# Patient Record
Sex: MALE | Race: WHITE
[De-identification: names, ages, dates, MRNs, and addresses within clinical notes are randomized per-mention and may not be internally consistent; named-entity substitution may affect disease eponyms.]

---

## 2018-07-27 ENCOUNTER — HOSPITAL ENCOUNTER (INPATIENT)
Dept: HOSPITAL 45 - C.CATH | Age: 63
LOS: 1 days | Discharge: HOME | DRG: 247 | End: 2018-07-28
Attending: HOSPITALIST | Admitting: HOSPITALIST
Payer: COMMERCIAL

## 2018-07-27 VITALS — DIASTOLIC BLOOD PRESSURE: 85 MMHG | HEART RATE: 62 BPM | SYSTOLIC BLOOD PRESSURE: 119 MMHG | OXYGEN SATURATION: 97 %

## 2018-07-27 VITALS
TEMPERATURE: 98.6 F | HEART RATE: 68 BPM | DIASTOLIC BLOOD PRESSURE: 77 MMHG | SYSTOLIC BLOOD PRESSURE: 114 MMHG | OXYGEN SATURATION: 95 %

## 2018-07-27 VITALS — HEART RATE: 61 BPM | DIASTOLIC BLOOD PRESSURE: 79 MMHG | SYSTOLIC BLOOD PRESSURE: 108 MMHG | OXYGEN SATURATION: 97 %

## 2018-07-27 VITALS — OXYGEN SATURATION: 95 % | DIASTOLIC BLOOD PRESSURE: 84 MMHG | SYSTOLIC BLOOD PRESSURE: 111 MMHG | HEART RATE: 65 BPM

## 2018-07-27 VITALS — HEART RATE: 62 BPM | SYSTOLIC BLOOD PRESSURE: 119 MMHG | OXYGEN SATURATION: 99 % | DIASTOLIC BLOOD PRESSURE: 85 MMHG

## 2018-07-27 VITALS — DIASTOLIC BLOOD PRESSURE: 88 MMHG | OXYGEN SATURATION: 97 % | HEART RATE: 57 BPM | SYSTOLIC BLOOD PRESSURE: 124 MMHG

## 2018-07-27 VITALS — HEART RATE: 63 BPM | OXYGEN SATURATION: 97 %

## 2018-07-27 VITALS
BODY MASS INDEX: 38.62 KG/M2 | WEIGHT: 240.3 LBS | HEIGHT: 66 IN | BODY MASS INDEX: 38.62 KG/M2 | WEIGHT: 240.3 LBS | BODY MASS INDEX: 38.62 KG/M2 | HEIGHT: 66 IN

## 2018-07-27 VITALS — DIASTOLIC BLOOD PRESSURE: 86 MMHG | HEART RATE: 59 BPM | SYSTOLIC BLOOD PRESSURE: 115 MMHG

## 2018-07-27 VITALS
SYSTOLIC BLOOD PRESSURE: 123 MMHG | TEMPERATURE: 97.52 F | HEART RATE: 59 BPM | OXYGEN SATURATION: 98 % | DIASTOLIC BLOOD PRESSURE: 83 MMHG

## 2018-07-27 VITALS — DIASTOLIC BLOOD PRESSURE: 86 MMHG | SYSTOLIC BLOOD PRESSURE: 134 MMHG | OXYGEN SATURATION: 95 % | HEART RATE: 64 BPM

## 2018-07-27 VITALS — OXYGEN SATURATION: 97 % | SYSTOLIC BLOOD PRESSURE: 127 MMHG | HEART RATE: 56 BPM | DIASTOLIC BLOOD PRESSURE: 92 MMHG

## 2018-07-27 VITALS — OXYGEN SATURATION: 99 % | SYSTOLIC BLOOD PRESSURE: 103 MMHG | HEART RATE: 59 BPM | DIASTOLIC BLOOD PRESSURE: 69 MMHG

## 2018-07-27 VITALS
HEART RATE: 69 BPM | DIASTOLIC BLOOD PRESSURE: 83 MMHG | SYSTOLIC BLOOD PRESSURE: 139 MMHG | TEMPERATURE: 98.96 F | OXYGEN SATURATION: 98 %

## 2018-07-27 VITALS — OXYGEN SATURATION: 97 % | HEART RATE: 56 BPM | SYSTOLIC BLOOD PRESSURE: 110 MMHG | DIASTOLIC BLOOD PRESSURE: 81 MMHG

## 2018-07-27 VITALS
DIASTOLIC BLOOD PRESSURE: 80 MMHG | TEMPERATURE: 98.06 F | SYSTOLIC BLOOD PRESSURE: 112 MMHG | HEART RATE: 60 BPM | OXYGEN SATURATION: 98 %

## 2018-07-27 VITALS — DIASTOLIC BLOOD PRESSURE: 81 MMHG | SYSTOLIC BLOOD PRESSURE: 118 MMHG | HEART RATE: 64 BPM

## 2018-07-27 VITALS — OXYGEN SATURATION: 98 % | SYSTOLIC BLOOD PRESSURE: 123 MMHG | HEART RATE: 58 BPM | DIASTOLIC BLOOD PRESSURE: 87 MMHG

## 2018-07-27 DIAGNOSIS — E78.5: ICD-10-CM

## 2018-07-27 DIAGNOSIS — Z79.1: ICD-10-CM

## 2018-07-27 DIAGNOSIS — Z82.49: ICD-10-CM

## 2018-07-27 DIAGNOSIS — I10: ICD-10-CM

## 2018-07-27 DIAGNOSIS — M1A.00X0: ICD-10-CM

## 2018-07-27 DIAGNOSIS — M79.2: ICD-10-CM

## 2018-07-27 DIAGNOSIS — Z79.899: ICD-10-CM

## 2018-07-27 DIAGNOSIS — K21.0: ICD-10-CM

## 2018-07-27 DIAGNOSIS — J30.9: ICD-10-CM

## 2018-07-27 DIAGNOSIS — I25.10: Primary | ICD-10-CM

## 2018-07-27 PROCEDURE — B41FYZZ FLUOROSCOPY OF RIGHT LOWER EXTREMITY ARTERIES USING OTHER CONTRAST: ICD-10-PCS | Performed by: INTERNAL MEDICINE

## 2018-07-27 PROCEDURE — B211YZZ FLUOROSCOPY OF MULTIPLE CORONARY ARTERIES USING OTHER CONTRAST: ICD-10-PCS | Performed by: INTERNAL MEDICINE

## 2018-07-27 PROCEDURE — 4A023N7 MEASUREMENT OF CARDIAC SAMPLING AND PRESSURE, LEFT HEART, PERCUTANEOUS APPROACH: ICD-10-PCS | Performed by: INTERNAL MEDICINE

## 2018-07-27 PROCEDURE — B215YZZ FLUOROSCOPY OF LEFT HEART USING OTHER CONTRAST: ICD-10-PCS | Performed by: INTERNAL MEDICINE

## 2018-07-27 PROCEDURE — 027034Z DILATION OF CORONARY ARTERY, ONE ARTERY WITH DRUG-ELUTING INTRALUMINAL DEVICE, PERCUTANEOUS APPROACH: ICD-10-PCS | Performed by: INTERNAL MEDICINE

## 2018-07-27 RX ADMIN — ACETAMINOPHEN PRN MG: 325 TABLET ORAL at 19:38

## 2018-07-27 RX ADMIN — ALBUTEROL SULFATE SCH PUFFS: 90 AEROSOL, METERED RESPIRATORY (INHALATION) at 17:24

## 2018-07-27 RX ADMIN — SODIUM CHLORIDE SCH MLS/HR: 900 INJECTION, SOLUTION INTRAVENOUS at 09:45

## 2018-07-27 RX ADMIN — SODIUM CHLORIDE SCH MLS/HR: 900 INJECTION, SOLUTION INTRAVENOUS at 19:37

## 2018-07-27 RX ADMIN — ALBUTEROL SULFATE SCH PUFFS: 90 AEROSOL, METERED RESPIRATORY (INHALATION) at 19:36

## 2018-07-27 NOTE — CARDIAC CATHETERIZATION
Procedure Note


Procedure Date


Jul 27, 2018.





Pre-Procedure Diagnosis


Positive Stress Test





AUC Score


7





Post-Procedure Diagnosis


Severe CAD, Successful PCI





Procedure(s) Performed


Drug Eluting Stent, Femoral Artery Angiography





Cardiologist


Taco





Assistant(s)


Contino





Estimated Blood Loss


<15





Medication(s)


Clopidogrel, Fentanyl, Heparin, Nitroglycerin, Versed, Lidocaine 1%





Summary of Findings


Indication: Positive stress test





Access: 6Fr right CFA


Catheters: EBU 3.5 guide





Findings:


For full details of patient's coronary angiography please cath report dictated 

by Dr. Umaña.





Briefly, patient found to have severe single vessel disease with a 70-80% 

stenosis involving the proximal to mid LAD.  





Decision to proceed with PCI.


-- PCI --


Antithrombotic therapy: Heparin, Clopidogrel


Procedure:


LM cannulated with EBU 3.5 guide


BMW wire passed across lesion into distal vessel


Proximal/mid LAD lesion predilated with 2.5 compliant balloon


Dilated lesion stented with 2.75 x 18 [place] MARTA


Stent post-dilated with 3.0 noncompliant balloon


IC vasodilators administered for spasm


Post procedure TAMIE 3 flow, stent well expanded with minimal residual stenosis 

and no apparent cardiac complications.  





Arterial Closure: Angio-seal





Summary:


1. Successful PCI of proximal to mid LAD with single MARTA (2.75 x 18 [place]; post-

dilated with 3.0 NC).





Recommendations:


To PCU for continued monitoring


Loaded with Clopidogrel 600mg 


Continue dual-antiplatelet therapy for at least 6 months.


Continue statin, and ASCVD risk factor modification


Consult cardiac Rehab





Hemodynamics


Rest Ao:


130/69/98


Final Ao:


110/73/91


LV:


--





Recommendations


PCI without planned CABG





Specimens


None





Radiation Exposure (mGy)


3741





Contrast (mls)


210 opti (total)





Fluids (cc crystalloids)


123 NSS





Drains


none





Anesthesia


moderate





Procedural Complication(s)


None





Disposition


PCU





ACC Data


Cardiac Status


Clinical evaluation leading to the procedure


CAD Presntation:  Positive Stress Test


Heart Failure:  NYHA Class: CCS II


Cardiogenic Shock w/in 24Hrs:  No


Cardiac Arrest w/in 24Hrs:  No


Imaging studies past 6 months:  Yes


Stress studies past 6 months:  Yes


Stress Echocardiogram:  Yes - Positive, Risk/Extent of Ischemia (Low)





Closure Device


Percutaneous Entry Location:  Femoral


Closure Device:  Angio-Seal


Recommendations:  PCI without planned CABG


PCI Indication:  + Stress Test





Lesion


Segment Name:  mid LAD


Culprit Artery:  Yes


Stenosis Prior to Rx (%):  70-80%


Chronic Total Occlusion:  No


IVUS:  No


FFR:  No


Pre-Procedure TAMIE Flow:  3


Previously Treated Lesion:  No


Lesion Complexity:  Non-High/Non-C


Lesion Length (mm):  12


Thrombus Present:  No


Bifurcation Lesion:  Yes


Guidewire Across Lesion:  Yes


Guidewire:  


   Stenosis Post-Procedure (%):  0


   Device(s) Deployed:  Yes





Intraprocedure Events


Significant Dissection:  No


Perforation:  No

## 2018-07-27 NOTE — PROCEDURE NOTE
Cardiac Cath Report


Procedure:


1.  Left heart catheterization


2.  Coronary angiography


3.  Left ventriculography





History:


This is a 63-year-old male patient who has been experiencing exertional dyspnea 

and chest discomfort.  He had an equivocal exercise stress echocardiogram and 

has been referred for cardiac catheterization.





Procedure summary:


The patient had a Barbeau maneuver which indicated poor collateral circulation 

to the right hand.  Therefore after informed consent was obtained and the 

patient was prepped and draped in the usual manner a right transfemoral 

approach was utilized.  Preformed 5 [place] diagnostic catheters were utilized 

for the coronary angiograms.  A 5 [place] pigtail catheter was utilized for the 

left ventriculogram.  Following the procedure the patient underwent coronary 

intervention and was admitted in stable condition.





Coronary angiography:


Selective injections of the left coronary artery reveal a dual ostium left main 

trunk.  The left circumflex artery is dominant.  The left circumflex artery has 

multiple marginal branches the left circumflex artery has mild diffuse 

nonobstructive disease.  The left anterior descending artery gives off to small 

to medium sized diagonal branches.  The LAD is diffusely diseased.  At the 

takeoff of the first diagonal branch and first septal branch there is a high-

grade eccentric stenosis.  Selective injections of the right coronary artery 

revealed to be small and nondominant.  The right coronary artery has diffuse 

nonobstructive disease.





Left ventriculogram:


The left ventricle is of normal size with normal systolic function.  The aortic 

root and ascending aorta have normal morphology and diameter.  The mitral valve 

is competent.  The LVEDP is 10.





Summary:


The patient has a dominant left system.  There is a high-grade stenosis in the 

mid LAD.  The remainder the coronary anatomy has nonobstructive disease.  The 

left ventricular function is normal.  The estimated left ventricular ejection 

fraction is 60%.





Recommendations:


Are for coronary intervention on the LAD.

## 2018-07-27 NOTE — CARDIAC CATHETERIZATION
Procedure Note


Procedure Date


Jul 27, 2018.





Pre-Procedure Diagnosis


Angina





AUC Score


7





Post-Procedure Diagnosis


Severe CAD





Procedure(s) Performed


Coronary Angiography, Left Heart Cath, LV Angiography





Cardiologist


Dr. Umaña





Assistant(s)


None





Estimated Blood Loss


None





Medication(s)


Versed, Lidocaine 1%





Summary of Findings


See dictated report





Hemodynamics


Rest Ao:


125/79


Final Ao:


130/69


LV:


129/10





Recommendations


PCI without planned CABG





Specimens


None





Radiation Exposure (mGy)


1440





Contrast (mls)


90





Procedural Complication(s)


None





Disposition


PCU





ACC Data


Cardiac Status


Clinical evaluation leading to the procedure


CAD Presntation:  Stable angina


Anginal Classification:  CCS II


Heart Failure:  No


Cardiogenic Shock w/in 24Hrs:  No


Cardiac Arrest w/in 24Hrs:  No


Imaging studies past 6 months:  No


Stress studies past 6 months:  Yes


Stress Echocardiogram:  Yes - Indeterminant





Coronary Anatomy


Dominant:  Left


Left Main (% Stenosis):  Normal


LAD (% Stenosis):  Mid (90%)


Circumflex (% Stenosis):  Normal


RCA (% Stenosis):  Normal





Left Ventricular Angiography


EF (%):  60%





Diagnostic


Physician's Name:  Lee Umaña, DO


Status:  Elective





Closure Device


Percutaneous Entry Location:  Femoral

## 2018-07-27 NOTE — HISTORY AND PHYSICAL
History & Physical


Date & Time of Service:


2018 ~ 11:30


Chief Complaint:


Chest pain


Primary Care Physician:


Skylar Jarvis M.D.


History of Present Illness


63-year-old male who is status post plan cardiac catheterization today with 

Cornel Umaña in [place].  Patient had an outpatient stress test that was equivocal 

and therefore presented for the planned procedure today.  Cardiac cath showed a 

70-80% mid LAD lesion and one MARTA was placed.  Post-cath, patient is having 

some mild left-sided residual chest pain.  He currently rates the pain #2/10.  

He denies any radiation of the pain into his jaw, neck, shoulder, arm.  He 

denies shortness of breath and diaphoresis.  No lightheadedness or dizziness.  

He denies abdominal pain and nausea.  No other recent illnesses, fevers, chills.





Past Medical/Surgical History


Medical Problems:


(1) CAD (coronary artery disease)


Permanent Comment: 18-MARTA to LAD


Status: Chronic  





(2) Dyslipidemia


Status: Chronic  





(3) GERD (gastroesophageal reflux disease)


Status: Chronic  





(4) Gout


Status: Chronic  





(5) Hypertension


Status: Chronic  





Surgical Problems:


(1) History of appendectomy


Status: Chronic  











Family History





FH: CAD (coronary artery disease)


  FATHER (First MI at age 47)


  MOTHER (MI in her 60s)


Stroke


  FATHER ( from CVA at age 61)





Social History


Smoking Status:  Never Smoker


Alcohol Use:  none





Immunizations


History of Influenza Vaccine:  Yes


Influenza Vaccine Date:  Oct 11, 2017


History of Tetanus Vaccine?:  Yes


Tetanus Immunization Date:  Sep 16, 2011





Allergies


Coded Allergies:  


     No Known Allergies (Verified  Allergy, Mild, 10/1/07)





Home Medications


Scheduled


Albuterol Hfa (Ventolin Hfa), 1 PUFF INH Q4


Allopurinol (Zyloprim), 300 MG PO DAILY


Cholecalciferol (Vitamin D3 400), 400 INTER.UNIT PO DAILY


Fexofenadine Hcl (Allegra), 180 MG PO DAILY


Lansoprazole (Prevacid), 30 MG PO DAILY


Losartan Potassium (Cozaar), 50 MG PO DAILY


Sildenafil Citrate (Viagra), 100 MG PO PRN


Simvastatin (Zocor), 10 MG PO QPM


Sulindac (Sulindac), BID





Review of Systems


ROS per HPI, all other systems reviewed and negative





Physical Exam


Vital Signs











  Date Time  Temp Pulse Resp B/P (MAP) Pulse Ox O2 Delivery O2 Flow Rate FiO2


 


18 11:04  64 18 134/86 (102) 95 Nasal Cannula  


 


18 10:34  63 17  97   


 


18 10:30  56 16 127/92 (104) 97 Room Air  


 


18 10:15  57 16 124/88 (100) 97 Room Air  


 


18 10:09 36.4 59 18 123/83 98 Room Air  


 


18 09:40  66 16 126/93 (104) 100 Room Air  


 


18 09:35  66 16 128/86 (100) 99 Room Air  


 


18 09:30  66 16 116/83 (94) 99 Room Air  


 


18 07:17 37.2 69 14 139/83 (101) 98 Room Air  








General Appearance:  WD/WN, no apparent distress


Head:  normocephalic, atraumatic


Eyes:  normal inspection, EOMI, sclerae normal


ENT:  hearing grossly normal, + pertinent finding (Mucous membranes moist)


Neck:  supple, no JVD, trachea midline


Respiratory/Chest:  lungs clear, normal breath sounds, no respiratory distress


Cardiovascular:  regular rate, rhythm, no edema, normal peripheral pulses


Abdomen/GI:  normal bowel sounds, non tender, soft, no organomegaly


Extremities/Musculoskelatal:  normal inspection, no calf tenderness, normal 

capillary refill


Neurologic/Psych:  no motor/sensory deficits, alert, normal mood/affect, 

oriented x 3


Skin:  normal color, warm/dry





Diagnostics


Laboratory Results





Results Past 24 Hours








Test


  18


09:04 Range/Units


 


 


Kaolin Activated Coagulation


Time 202


    SECONDS


 











Impression


Assessment and Plan


CAD


-Admitted to telemetry post-cath


-Patient presented for planned cardiac catheterization after outpatient stress 

test was equivocal


-Cardiac cath showed a 70-80% mid LAD lesion, patient underwent MARTA placement


-Monitor overnight for worsening chest pain


-Started on dual antiplatelet therapy; continue outpatient statin and ARB





HYPERTENSION


-BP controlled, continue losartan





GERD


-Continue PPI





GOUT


-Continue allopurinol





DVT PROPHYLAXIS


-SCDs given the recent procedure





ADDENDUM:





This is a 63 year old male with a past medical history of HTN, CAD - presents 

for a scheduled cardiac catheterization.


Had one stent placed earlier today and is now in the PCU for monitoring.


He states he's doing fine; had 2/10 chest pain earlier, but now is improving.


Denies shortness of breath; no nausea/vomiting





Plan:


continued tele monitoring


monitor for chest pain; monitor labs in AM


repeat troponin as per cardiology; further management as per cardiology





Advanced Directives


Existing Living Will:  No


Existing Power of :  No





Resuscitation Status








VTE Prophylaxis


Will order VTE Prophylaxis:  Yes

## 2018-07-27 NOTE — [facility] POST OPERATIVE BRIEF NOTE
Preliminary Procedure Note


Procedure Date


Jul 27, 2018.





Pre-Procedure Diagnosis


Positive Stress Test





AUC Score


7





Post-Procedure Diagnosis


Severe CAD, Successful PCI





Procedure(s) Performed


Drug Eluting Stent





Cardiologist


Taco





Assistant(s)


Contino





Estimated Blood Loss


<15





Medication(s)


Clopidogrel, Fentanyl, Heparin, Nitroglycerin, Versed, Lidocaine 1%





Preliminary Findings


1. 70-80% calcified mid LAD stenosis.





2. Successful PCI of proximal to mid LAD with single MARTA (2.75 x 18 [place]; post-

dilated to 3.0).





Recommendations


PCI without planned CABG





Specimens


None





Drains


none





Anesthesia


moderate





Procedural Complication(s)


None





Disposition


PCU

## 2018-07-27 NOTE — POST SEDATION ASSESSMENT
Post Sedation Assessment


General


Date of Sedation


Jul 27, 2018.


Vital Signs:





Vital Signs Past 12 Hours








  Date Time  Temp Pulse Resp B/P (MAP) Pulse Ox O2 Delivery O2 Flow Rate FiO2


 


7/27/18 09:35  66 16 128/86 (100) 99 Room Air  


 


7/27/18 09:30  66 16 116/83 (94) 99 Room Air  


 


7/27/18 07:17 37.2 69 14 139/83 (101) 98 Room Air  











Post Procedure Recovery Score


Activity:  (2) Moves 4 extremities *


Respiration:  (2) Deep breath/cough


Circulation:  (2) +/-20% PreAnes Value


Consciousness:  (2) Fully Awake


Oxygen Saturation:  (2) > 92% On Room Air


Post Anesthesia Score:  10





Discharge Sedation


Level of Care:  Fast Track Phase II





Post Sedation Plan


On clinical assessment, the patient appears to have tolerated the sedation 

without complications. Patient is recovering as anticipated.





Patient will continue to be monitored by nursing and may be discharged when 

sedation discharge criteria are met per below protocol. 





Upon Completions of procedure and additional 15 minutes continue every 5 minute 

vital signs and the P.A.R. score; then discharge to a Phase I or Fast Track to 

Phase II per the following guidelines:





* Discharge Patient to appropriate Phase II area if PAR is 8 or greater or 

return to pre- procedure baseline.  The post - procedure orders will be as 

directed. 





* If PAR score is less than 8 or not return to pre-procedure baseline then 

patient will follow Phase I monitoring till PAR is reached for Phase II.  The 

Phase I may be done in procedure room or may call to secure a Phase I area.





* If naloxone or flumazenil are used for reversal, hold in Phase I for an 

additional 60 -120 minutes before discharge to Phase II.  Please call the 

Sedation Physician to re-evaluate and complete post-note for discharge to Phase 

II area. 





Do NOT discharge from procedure sedation or Phase 1 until post- sedation 

evaluation note is complete by procedure /sedation MD





Sedation Discharge Instructions to be given to the patient at discharge to home.

## 2018-07-27 NOTE — DIAGNOSTIC IMAGING REPORT
RIGHT LOWER EXTREMITY VENOUS DOPPLER



CLINICAL HISTORY: Leg swelling.    



COMPARISON STUDY:  No previous studies for comparison. 



TECHNIQUE:  Sonography of the deep venous system of the right lower extremity

was performed. Compression and augmentation were evaluated.



FINDINGS:  The right common femoral, superficial femoral and popliteal veins

were compressible. Augmentation was normal. Flow was shown within the deep calf

vessels.



IMPRESSION: No evidence of deep venous thrombus within the right lower

extremity.







Electronically signed by:  Jagjit Orta M.D.

7/27/2018 10:33 PM



Dictated Date/Time:  7/27/2018 10:33 PM

## 2018-07-28 VITALS
HEART RATE: 52 BPM | OXYGEN SATURATION: 96 % | TEMPERATURE: 98.42 F | SYSTOLIC BLOOD PRESSURE: 126 MMHG | DIASTOLIC BLOOD PRESSURE: 89 MMHG

## 2018-07-28 VITALS
SYSTOLIC BLOOD PRESSURE: 129 MMHG | DIASTOLIC BLOOD PRESSURE: 91 MMHG | TEMPERATURE: 98.24 F | OXYGEN SATURATION: 97 % | HEART RATE: 65 BPM

## 2018-07-28 VITALS — OXYGEN SATURATION: 96 % | HEART RATE: 74 BPM | DIASTOLIC BLOOD PRESSURE: 82 MMHG | SYSTOLIC BLOOD PRESSURE: 146 MMHG

## 2018-07-28 VITALS
DIASTOLIC BLOOD PRESSURE: 68 MMHG | HEART RATE: 56 BPM | TEMPERATURE: 98.78 F | SYSTOLIC BLOOD PRESSURE: 105 MMHG | OXYGEN SATURATION: 97 %

## 2018-07-28 VITALS
TEMPERATURE: 98.24 F | HEART RATE: 74 BPM | SYSTOLIC BLOOD PRESSURE: 146 MMHG | OXYGEN SATURATION: 96 % | DIASTOLIC BLOOD PRESSURE: 82 MMHG

## 2018-07-28 LAB
BASOPHILS # BLD: 0.02 K/UL (ref 0–0.2)
BASOPHILS NFR BLD: 0.3 %
BUN SERPL-MCNC: 16 MG/DL (ref 7–18)
CALCIUM SERPL-MCNC: 7.9 MG/DL (ref 8.5–10.1)
CO2 SERPL-SCNC: 25 MMOL/L (ref 21–32)
CREAT SERPL-MCNC: 1.07 MG/DL (ref 0.6–1.4)
EOS ABS #: 0.12 K/UL (ref 0–0.5)
EOSINOPHIL NFR BLD AUTO: 179 K/UL (ref 130–400)
GLUCOSE SERPL-MCNC: 87 MG/DL (ref 70–99)
HCT VFR BLD CALC: 40.6 % (ref 42–52)
HGB BLD-MCNC: 14 G/DL (ref 14–18)
IG#: 0.01 K/UL (ref 0–0.02)
IMM GRANULOCYTES NFR BLD AUTO: 30.7 %
LYMPHOCYTES # BLD: 2.35 K/UL (ref 1.2–3.4)
MCH RBC QN AUTO: 29.7 PG (ref 25–34)
MCHC RBC AUTO-ENTMCNC: 34.5 G/DL (ref 32–36)
MCV RBC AUTO: 86.2 FL (ref 80–100)
MONO ABS #: 0.69 K/UL (ref 0.11–0.59)
MONOCYTES NFR BLD: 9 %
NEUT ABS #: 4.46 K/UL (ref 1.4–6.5)
NEUTROPHILS # BLD AUTO: 1.6 %
NEUTROPHILS NFR BLD AUTO: 58.3 %
PMV BLD AUTO: 11.1 FL (ref 7.4–10.4)
POTASSIUM SERPL-SCNC: 4 MMOL/L (ref 3.5–5.1)
RED CELL DISTRIBUTION WIDTH CV: 12.6 % (ref 11.5–14.5)
RED CELL DISTRIBUTION WIDTH SD: 39.8 FL (ref 36.4–46.3)
SODIUM SERPL-SCNC: 139 MMOL/L (ref 136–145)
WBC # BLD AUTO: 7.65 K/UL (ref 4.8–10.8)

## 2018-07-28 RX ADMIN — ALBUTEROL SULFATE SCH PUFFS: 90 AEROSOL, METERED RESPIRATORY (INHALATION) at 12:00

## 2018-07-28 RX ADMIN — ALBUTEROL SULFATE SCH PUFFS: 90 AEROSOL, METERED RESPIRATORY (INHALATION) at 08:00

## 2018-07-28 RX ADMIN — SODIUM CHLORIDE SCH MLS/HR: 900 INJECTION, SOLUTION INTRAVENOUS at 04:31

## 2018-07-28 RX ADMIN — ACETAMINOPHEN PRN MG: 325 TABLET ORAL at 04:26

## 2018-07-28 RX ADMIN — ALBUTEROL SULFATE SCH PUFFS: 90 AEROSOL, METERED RESPIRATORY (INHALATION) at 00:00

## 2018-07-28 RX ADMIN — ALBUTEROL SULFATE SCH PUFFS: 90 AEROSOL, METERED RESPIRATORY (INHALATION) at 04:00

## 2018-07-28 NOTE — DISCHARGE INSTRUCTIONS
Discharge Instructions


Date of Service


Jul 28, 2018.





Admission


Reason for Admission:  CAD





Discharge


Discharge Diagnosis / Problem:  CAD,S/P Cardiac Cath and MARTA in LAD





Discharge Goals


Goal(s):  Prevent Disease Progression





Activity Recommendations


Activity Limitations:  resume your previous activity





.





Instructions / Follow-Up


Instructions / Follow-Up


Dr Jarvis on 8/2/18 at 12:45 PM .Dr Umaña on 8/14/18 at 8:30AM





Current Hospital Diet


Patient's current hospital diet: AHA Diet (Heart Healthy)





Discharge Diet


Recommended Diet:  AHA Diet (Heart Healthy)





Pending Studies


Studies pending at discharge:  no





Medical Emergencies








.


Who to Call and When:





Medical Emergencies:  If at any time you feel your situation is an emergency, 

please call 911 immediately.





.





Non-Emergent Contact


Non-Emergency issues call your:  Primary Care Provider





.





Past History


Medical & Surgical History:  


(1) CAD (coronary artery disease)


(2) Dyslipidemia


(3) Gout


(4) Hypertension


(5) GERD (gastroesophageal reflux disease)


(6) History of appendectomy


.








"Provider Documentation" section prepared by Chris Orozco.








.





Consultant Recommendations


Consultant Recommendations:


ACTIVITY RECOMMENDATIONS:





It is common to feel weak and fatigue for a few days.





*  Do not drive or operate any motorized equipment for the next


    three days.





*  Limit stair usage (2 or 3 trips a day only) for the next three days.





*  Do not lift anything heavier than 10 pounds for the next three


    days.





*  Do not engage in vigorous exercise or any sports for the next


    five days.





*  You may shower the day after your procedure, but do not 


    immerse the area for three days.  Cleanse the site gently with


    soap and water.








SPECIAL CARE INSTRUCTIONS:





* You may replace the pressure dressing or band-aid the morning 


after the procedure.





* After your procedure, it is normal to have a small bruise or small lump


at the site.  Examine your site daily for any change in the bruise or lump,


redness, swelling, drainage or numbness.  


Notify your doctor if any change.





BLEEDING:





* If there is a small amount of bleeding at the site, lie down and apply


firm pressure with a clean cloth for ten minutes.  When the bleeding stops,


lie quietly keeping the procedure limb straight for six hours.  


Notify your doctor as soon as possible.





* If the bleeding does not stop after ten minutes or if there is a large


amount of bleeding or spurting, call 911 immediately.  Continue to lie 


down and hold firm pressure until help arrives.





SKIN IRRITATION:





*  You may experience some redness and/or swelling in the area where radiation 

was


administered.  If any skin irritation occurs, please contact your family 

physician.








FOLLOW UP VISIT:





Keep any scheduled doctor appointments.

## 2018-07-28 NOTE — PROGRESS NOTE
Progress Note


Date of Service


2018.





Progress Note


Certified that Michele MAGDALENO Tami LÓPEZ 1955 has been under my care in [facility] since 2018.


He was advised to go back to his work on Thursday, .





Dr. DEB Orozco

## 2018-07-28 NOTE — PROGRESS NOTE
Internal Med Progress Note


Date of Service:


Jul 28, 2018.


Provider Documentation:





SUBJECTIVE:





The patient was seen and examined in ICU


Admitted with them unstable angina and is status post cardiac cath and MARTA in 

LAD


Has been doing fine since the procedure


Denies any symptoms as of today except some numbness at the right groin








OBJECTIVE:





Vital Signs-as noted below





Exam:


General-no apparent distress at rest


Ambulating well


Eyes-normal


ENT-normal


Neck-supple


Lungs-clear to auscultate bilaterally


Heart-regular, no murmur appreciated


Abdomen-benign


Extremities-no edema


Local examination of the right groin, no significant abnormality


Neuro-alert,awake oriented 3


No focal sensory and motor deficit





Lab data as noted below.


ASSESSMENT & PLAN:


CAD


Exertional angina status post cardiac cath and MARTA in LAD-7/27/18


-Patient presented for planned cardiac catheterization after outpatient stress 

test was equivocal


-Cardiac cath showed a 70-80% mid LAD lesion, patient underwent MARTA placement


-Remains stable in ICU without any arrhythmias


-Started on dual antiplatelet therapy; continue outpatient statin with 

increasing dose and ARB.  No beta-blocker due to bradycardia


-Was evaluated by cardiologist this morning


-Denies any symptoms and wants to go home





HYPERTENSION


-BP controlled, continue losartan





GERD


-Continue PPI





GOUT


-Continue allopurinol





DVT PROPHYLAXIS


-SCDs given the recent procedure








DISPOSITION


Discharged home today


Vital Signs:











  Date Time  Temp Pulse Resp B/P (MAP) Pulse Ox O2 Delivery O2 Flow Rate FiO2


 


7/28/18 11:33  74 15 146/82 (103) 96 Room Air  


 


7/28/18 08:01 36.8 65 14 129/91 (104) 97 Room Air  


 


7/28/18 08:00      Room Air  


 


7/28/18 04:01 36.9 52 15 126/89 (101) 96 Room Air  


 


7/28/18 00:01 37.1 56 16 105/68 (80) 97 Room Air  


 


7/27/18 20:01 37.0 68 15 114/77 (89) 95 Room Air  


 


7/27/18 20:00      Room Air  


 


7/27/18 17:16  65 18 111/84 (93) 95 Room Air  


 


7/27/18 17:01  64 18 118/81 (93)    


 


7/27/18 16:32  59 12 103/69 (80) 99 Room Air  


 


7/27/18 16:00  61 15 108/79 (89)    


 


7/27/18 16:00      Room Air  


 


7/27/18 16:00  61 15 108/79 (89) 97 Room Air  


 


7/27/18 15:31  56 16 110/81 (91) 97 Room Air  


 


7/27/18 15:16  58 13 123/87 (99) 98 Room Air  


 


7/27/18 15:01  59 18 115/86 (96)    








Lab Results:





Results Past 24 Hours








Test


  7/28/18


04:21 Range/Units


 


 


White Blood Count 7.65 4.8-10.8  K/uL


 


Red Blood Count 4.71 4.7-6.1  M/uL


 


Hemoglobin 14.0 14.0-18.0  g/dL


 


Hematocrit 40.6 42-52  %


 


Mean Corpuscular Volume 86.2   fL


 


Mean Corpuscular Hemoglobin 29.7 25-34  pg


 


Mean Corpuscular Hemoglobin


Concent 34.5


  32-36  g/dl


 


 


Platelet Count 179 130-400  K/uL


 


Mean Platelet Volume 11.1 7.4-10.4  fL


 


Neutrophils (%) (Auto) 58.3  %


 


Lymphocytes (%) (Auto) 30.7  %


 


Monocytes (%) (Auto) 9.0  %


 


Eosinophils (%) (Auto) 1.6  %


 


Basophils (%) (Auto) 0.3  %


 


Neutrophils # (Auto) 4.46 1.4-6.5  K/uL


 


Lymphocytes # (Auto) 2.35 1.2-3.4  K/uL


 


Monocytes # (Auto) 0.69 0.11-0.59  K/uL


 


Eosinophils # (Auto) 0.12 0-0.5  K/uL


 


Basophils # (Auto) 0.02 0-0.2  K/uL


 


RDW Standard Deviation 39.8 36.4-46.3  fL


 


RDW Coefficient of Variation 12.6 11.5-14.5  %


 


Immature Granulocyte % (Auto) 0.1  %


 


Immature Granulocyte # (Auto) 0.01 0.00-0.02  K/uL


 


Sodium Level 139 136-145  mmol/L


 


Potassium Level 4.0 3.5-5.1  mmol/L


 


Chloride Level 109   mmol/L


 


Carbon Dioxide Level 25 21-32  mmol/L


 


Anion Gap 5.0 3-11  mmol/L


 


Blood Urea Nitrogen 16 7-18  mg/dl


 


Creatinine


  1.07


  0.60-1.40


mg/dl


 


Est Creatinine Clear Calc


Drug Dose 83.9


   ml/min


 


 


Estimated GFR (


American) 86.4


   


 


 


Estimated GFR (Non-


American 74.5


   


 


 


BUN/Creatinine Ratio 14.9 10-20  


 


Random Glucose 87 70-99  mg/dl


 


Calcium Level 7.9 8.5-10.1  mg/dl

## 2018-07-28 NOTE — CARDIOLOGY FOLLOW-UP
Subjective


General


Date of Service:


Jul 28, 2018.


Chief Complaint:  follow up CAD, LAD stent


Pt evaluation today including:  conversation w/ patient, conversation w/ family

, physical exam, chart review, lab review





History of Present Illness


The patient is a 63 year old male seen in follow up in coverage of Dr Umaña.


Patient feeling well.


Denies chest pain or shortness of breath.


Right groin pain radiating to R testicle noted shortly after procedure 

yesterday -resolved. LEVduplex negative for R DVT.





Allergies


Coded Allergies:  


     No Known Allergies (Verified  Allergy, Mild, 10/1/07)





Social History


Smoking Status:  Never Smoker


Hx Alcohol Use - Type And Amou:  No


Hx Substance Use - Type And Am:  No





Physical Exam


Vital Signs





Last Vital Signs Documentation








  Date Time  Temp Pulse Resp B/P (MAP) Pulse Ox O2 Delivery O2 Flow Rate FiO2


 


7/28/18 08:01 36.8 65 14 129/91 (104) 97 Room Air  











Physical Exam


Constitutional:  


   General Apperance:  heathly-appearing


   Level of Distress:  NAD


Head:  normocephalic, atraumatic


Neck:  supple, trachea midline


Lungs:  


   Auscultation:  no wheezing, no rales/crackles, no rhonchi


Cardiovascular:  


   Heart Auscultation:  no murmurs, no rubs, no gallops


Peripheral Pulses:  


   Bruits:  none appreciated


   Femoral Pulse:  normal on the left, normal on the right


Abdomen:  


   Bowel Sounds:  normal


   Inspection & Palpation:  soft, non-distended


Extremities:  no edema, no varicosities


Neurologic:  


   Gait & Station:  pertinent finding (no focal deficits )





Assessment and Plan


Assessment and Plan


Telemetry: SB and SR in the 50-60 bpm range, no significant arrhythmia





Impression:


63 year old male


1. Admitted for exertional angina, underwent cardiac cath 7/27/18 revealing 

single vessel CAD with high grade proximal LAD stenosis treated with PCI, MARTA


2. Post procedure R groin pain, likely femoral neuralgia from femoral artery 

access, symptoms resolved


3. HTN-controlled


4. Dyslipidemia


5. Borderline sinus bradycardia





Plan: 


Groin exam: no ecchymosis or evidence of hematoma. Soft.


Pt stable for discharge to home.


Pt to be discharged on life long low dose ASA 81 mg daily.


Clopidogrel 75 mg, current guidelines for MARTA in abscence of MI, dual 

antiplatelet therapy for 6-12 months. For now plan for 12 months-duration to be 

reassessed as outpatient.


HTN- continue losartan, PTA dose.





Holding off on starting metoprolol due to relative sinus bradycardia, HRs in 

50s at rest on telemetry. 





Dyslipidemia: DC simvastatin 10 mg, start atorvastatin 40 mg daily.





Follow up with Dr Umaña in 2-3 weeks.





OK to return to work late next week , Thursday if feeling well.


Will consider cardiac rehab when seen in follow up. 





Discharge instructions for femoral artery access: 





ACTIVITY RECOMMENDATIONS:





It is common to feel weak and fatigue for a few days.





*  Do not drive or operate any motorized equipment for the next


    three days.





*  Limit stair usage (2 or 3 trips a day only) for the next three days.





*  Do not lift anything heavier than 10 pounds for the next three


    days.





*  Do not engage in vigorous exercise or any sports for the next


    five days.





*  You may shower the day after your procedure, but do not 


    immerse the area for three days.  Cleanse the site gently with


    soap and water.








SPECIAL CARE INSTRUCTIONS:





* You may replace the pressure dressing or band-aid the morning 


after the procedure.





* After your procedure, it is normal to have a small bruise or small lump


at the site.  Examine your site daily for any change in the bruise or lump,


redness, swelling, drainage or numbness.  


Notify your doctor if any change.





BLEEDING:





* If there is a small amount of bleeding at the site, lie down and apply


firm pressure with a clean cloth for ten minutes.  When the bleeding stops,


lie quietly keeping the procedure limb straight for six hours.  


Notify your doctor as soon as possible.





* If the bleeding does not stop after ten minutes or if there is a large


amount of bleeding or spurting, call 911 immediately.  Continue to lie 


down and hold firm pressure until help arrives.





SKIN IRRITATION:





*  You may experience some redness and/or swelling in the area where radiation 

was


administered.  If any skin irritation occurs, please contact your family 

physician.








FOLLOW UP VISIT:





Keep any scheduled doctor appointments.





Laboratory Results





Last 24 Hours








Test


  7/28/18


04:21


 


White Blood Count 7.65 K/uL 


 


Red Blood Count 4.71 M/uL 


 


Hemoglobin 14.0 g/dL 


 


Hematocrit 40.6 % 


 


Mean Corpuscular Volume 86.2 fL 


 


Mean Corpuscular Hemoglobin 29.7 pg 


 


Mean Corpuscular Hemoglobin


Concent 34.5 g/dl 


 


 


Platelet Count 179 K/uL 


 


Mean Platelet Volume 11.1 fL 


 


Neutrophils (%) (Auto) 58.3 % 


 


Lymphocytes (%) (Auto) 30.7 % 


 


Monocytes (%) (Auto) 9.0 % 


 


Eosinophils (%) (Auto) 1.6 % 


 


Basophils (%) (Auto) 0.3 % 


 


Neutrophils # (Auto) 4.46 K/uL 


 


Lymphocytes # (Auto) 2.35 K/uL 


 


Monocytes # (Auto) 0.69 K/uL 


 


Eosinophils # (Auto) 0.12 K/uL 


 


Basophils # (Auto) 0.02 K/uL 


 


RDW Standard Deviation 39.8 fL 


 


RDW Coefficient of Variation 12.6 % 


 


Immature Granulocyte % (Auto) 0.1 % 


 


Immature Granulocyte # (Auto) 0.01 K/uL 


 


Sodium Level 139 mmol/L 


 


Potassium Level 4.0 mmol/L 


 


Chloride Level 109 mmol/L 


 


Carbon Dioxide Level 25 mmol/L 


 


Anion Gap 5.0 mmol/L 


 


Blood Urea Nitrogen 16 mg/dl 


 


Creatinine 1.07 mg/dl 


 


Est Creatinine Clear Calc


Drug Dose 83.9 ml/min 


 


 


Estimated GFR (


American) 86.4 


 


 


Estimated GFR (Non-


American 74.5 


 


 


BUN/Creatinine Ratio 14.9 


 


Random Glucose 87 mg/dl 


 


Calcium Level 7.9 mg/dl

## 2018-07-29 NOTE — DISCHARGE SUMMARY
Discharge Summary


Date of Service


Jul 29, 2018.





Discharge Summary


Admission Date:


Jul 27, 2018 at 09:48


Discharge Date:  Jul 28, 2018


Discharge Disposition:  Home


Principal Diagnosis:


CAD,S/P Cardiac Cath and MARTA in LAD


Secondary Diagnoses/Problems:


Please see H&P and Hospital progress note


Consultations:


Cardiology





Medication Reconciliation


New Medications:  


Aspirin (Aspirin) 81 Mg Tab


81 MG PO QAM for 30 Days, #30 TAB





Atorvastatin (Lipitor) 40 Mg Tab


40 MG PO HS for 30 Days, #30 TAB





Clopidogrel Bisulfate (Clopidogrel) 75 Mg Tab


75 MG PO QAM for 30 Days, #30 TAB


Continue for 12 Months with Aspirin


 


Continued Medications:  


Albuterol Hfa (Ventolin Hfa) 200 Puffs/59945 Mcg Aers


1 PUFF INH Q4, #1 INHALER





Allopurinol (Zyloprim) 300 Mg Tab


300 MG PO DAILY, TAB





Cholecalciferol (Vitamin D3 400) 400 Unit Cap


400 INTER.UNIT PO DAILY





Fexofenadine Hcl (Allegra) 180 Mg Tab


180 MG PO DAILY, TAB





Lansoprazole (Prevacid) 30 Mg Cap


30 MG PO DAILY, CAP





Losartan Potassium (Cozaar) 50 Mg Tab


50 MG PO DAILY, TAB





Sildenafil Citrate (Viagra) 100 Mg Tab


100 MG PO PRN, TAB





 


Discontinued Medications:  


Simvastatin (Zocor) 10 Mg Tab


10 MG PO QPM, TAB





Sulindac (Sulindac) 200 Mg Tab


BID











Admission Information


HPI (per Admitting provider):


63-year-old male who is status post plan cardiac catheterization today with 

Cornel Umaña in [place].  Patient had an outpatient stress test that was equivocal 

and therefore presented for the planned procedure today.  Cardiac cath showed a 

70-80% mid LAD lesion and one MARTA was placed.  Post-cath, patient is having 

some mild left-sided residual chest pain.  He currently rates the pain #2/10.  

He denies any radiation of the pain into his jaw, neck, shoulder, arm.  He 

denies shortness of breath and diaphoresis.  No lightheadedness or dizziness.  

He denies abdominal pain and nausea.  No other recent illnesses, fevers, chills.


Physical Exam (per Admitting):  


   General Appearance:  WD/WN, no apparent distress


   Head:  normocephalic, atraumatic


   Eyes:  normal inspection, EOMI, sclerae normal


   ENT:  hearing grossly normal, + pertinent finding (Mucous membranes moist)


   Neck:  supple, no JVD, trachea midline


   Respiratory/Chest:  lungs clear, normal breath sounds, no respiratory 

distress


   Cardiovascular:  regular rate, rhythm, no edema, normal peripheral pulses


   Abdomen/GI:  normal bowel sounds, non tender, soft, no organomegaly


   Extremities/Musculoskelatal:  normal inspection, no calf tenderness, normal 

capillary refill


   Neurologic/Psych:  no motor/sensory deficits, alert, normal mood/affect, 

oriented x 3


   Skin:  normal color, warm/dry





Hospital Course


CAD


Exertional angina status post cardiac cath and MARTA in LAD-7/27/18


-Patient presented for planned cardiac catheterization after outpatient stress 

test was equivocal


-Cardiac cath showed a 70-80% mid LAD lesion, patient underwent MARTA placement


-Remains stable in ICU without any arrhythmias


-Started on dual antiplatelet therapy; continue outpatient statin with 

increasing dose and ARB.  No beta-blocker due to bradycardia


-Was evaluated by cardiologist this morning


-Denies any symptoms and wants to go home





HYPERTENSION


-BP controlled, continue losartan





GERD


-Continue PPI





GOUT


-Continue allopurinol





DVT PROPHYLAXIS


-SCDs given the recent procedure








DISPOSITION


Discharged home today


Total time spent on discharge = 35 minutes


This includes examination of the patient, discharge planning, medication 

reconciliation, and communication with other providers.





Discharge Instructions


Date of Service


Jul 28, 2018.





Admission


Reason for Admission:  CAD





Discharge


Discharge Diagnosis / Problem:  CAD,S/P Cardiac Cath and MARTA in LAD





Discharge Goals


Goal(s):  Prevent Disease Progression





Activity Recommendations


Activity Limitations:  resume your previous activity





.





Instructions / Follow-Up


Instructions / Follow-Up


Dr Jarvis on 8/2/18 at 12:45 PM .Dr Umaña on 8/14/18 at 8:30AM





Current Hospital Diet


Patient's current hospital diet: AHA Diet (Heart Healthy)





Discharge Diet


Recommended Diet:  AHA Diet (Heart Healthy)





Pending Studies


Studies pending at discharge:  no





Medical Emergencies








.


Who to Call and When:





Medical Emergencies:  If at any time you feel your situation is an emergency, 

please call 911 immediately.





.





Non-Emergent Contact


Non-Emergency issues call your:  Primary Care Provider





.





Past History


Medical & Surgical History:  


(1) CAD (coronary artery disease)


(2) Dyslipidemia


(3) Gout


(4) Hypertension


(5) GERD (gastroesophageal reflux disease)


(6) History of appendectomy


.








"Provider Documentation" section prepared by Chris Orozco.








.





Consultant Recommendations


Consultant Recommendations:


ACTIVITY RECOMMENDATIONS:





It is common to feel weak and fatigue for a few days.





*  Do not drive or operate any motorized equipment for the next


    three days.





*  Limit stair usage (2 or 3 trips a day only) for the next three days.





*  Do not lift anything heavier than 10 pounds for the next three


    days.





*  Do not engage in vigorous exercise or any sports for the next


    five days.





*  You may shower the day after your procedure, but do not 


    immerse the area for three days.  Cleanse the site gently with


    soap and water.








SPECIAL CARE INSTRUCTIONS:





* You may replace the pressure dressing or band-aid the morning 


after the procedure.





* After your procedure, it is normal to have a small bruise or small lump


at the site.  Examine your site daily for any change in the bruise or lump,


redness, swelling, drainage or numbness.  


Notify your doctor if any change.





BLEEDING:





* If there is a small amount of bleeding at the site, lie down and apply


firm pressure with a clean cloth for ten minutes.  When the bleeding stops,


lie quietly keeping the procedure limb straight for six hours.  


Notify your doctor as soon as possible.





* If the bleeding does not stop after ten minutes or if there is a large


amount of bleeding or spurting, call 911 immediately.  Continue to lie 


down and hold firm pressure until help arrives.





SKIN IRRITATION:





*  You may experience some redness and/or swelling in the area where radiation 

was


administered.  If any skin irritation occurs, please contact your family 

physician.








FOLLOW UP VISIT:





Keep any scheduled doctor appointments.





Additional Copies To


Skylar Jarvis M.D.